# Patient Record
(demographics unavailable — no encounter records)

---

## 2025-07-09 NOTE — HISTORY OF PRESENT ILLNESS
[de-identified] : Fred is an 11-year-old male here to establish care for further evaluation and treatment for eosinophilic esophagitis.  He was previously cared at [facility] for his underlying disease process.  He had complaints of dysphagia regurgitation and underwent an upper endoscopy in January which revealed that he had 50 eosinophils per high-power field.  He was started on omeprazole 40 mg twice daily.  He was then switched to Dupixent, which he had trialed in April.  While on that medication, he developed anxiety secondary to the injections and the needle and thus stopped the Dupixent and restarted the omeprazole.  He has remained on the omeprazole since that time.  While on omeprazole he denies complaints of abdominal pain, dysphagia regurgitation or difficulty swallowing.  He has been stooling on a daily basis Bethany 2-4.  He is currently on a regular diet. On occasion he takes Aleve for complaints of knee pain.  He is an avid .  Dad reports he will take at most Aleve twice weekly.  PMH: EoE PSH: none Medications: omeprazole 40mg BID Allergies: none (dairy can aggravate his EoE symptoms but continues to have dairy in his diet) Family hx: PGF heart attack DM on paternal side and mom with BRCA gene MGM breast cancer Social: Fred lives with mom and dad

## 2025-07-09 NOTE — HISTORY OF PRESENT ILLNESS
[de-identified] : Fred is an 11-year-old male here to establish care for further evaluation and treatment for eosinophilic esophagitis.  He was previously cared at [facility] for his underlying disease process.  He had complaints of dysphagia regurgitation and underwent an upper endoscopy in January which revealed that he had 50 eosinophils per high-power field.  He was started on omeprazole 40 mg twice daily.  He was then switched to Dupixent, which he had trialed in April.  While on that medication, he developed anxiety secondary to the injections and the needle and thus stopped the Dupixent and restarted the omeprazole.  He has remained on the omeprazole since that time.  While on omeprazole he denies complaints of abdominal pain, dysphagia regurgitation or difficulty swallowing.  He has been stooling on a daily basis [place] 2-4.  He is currently on a regular diet. On occasion he takes Aleve for complaints of knee pain.  He is an avid .  Dad reports he will take at most Aleve twice weekly.  PMH: EoE PSH: none Medications: omeprazole 40mg BID Allergies: none (dairy can aggravate his EoE symptoms but continues to have dairy in his diet) Family hx: PGF heart attack DM on paternal side and mom with BRCA gene MGM breast cancer Social: Fred lives with mom and dad